# Patient Record
Sex: FEMALE | Race: WHITE | Employment: STUDENT | URBAN - METROPOLITAN AREA
[De-identification: names, ages, dates, MRNs, and addresses within clinical notes are randomized per-mention and may not be internally consistent; named-entity substitution may affect disease eponyms.]

---
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
## 2024-08-06 ENCOUNTER — OFFICE VISIT (OUTPATIENT)
Dept: ENDOCRINOLOGY | Age: 19
End: 2024-08-06

## 2024-08-06 VITALS
TEMPERATURE: 98.8 F | BODY MASS INDEX: 22.79 KG/M2 | HEIGHT: 61 IN | HEART RATE: 80 BPM | SYSTOLIC BLOOD PRESSURE: 104 MMHG | DIASTOLIC BLOOD PRESSURE: 69 MMHG | OXYGEN SATURATION: 98 % | WEIGHT: 120.7 LBS

## 2024-08-06 DIAGNOSIS — R82.998 ELEVATED URINARY FREE CORTISOL LEVEL: Primary | ICD-10-CM

## 2024-08-06 DIAGNOSIS — R53.83 OTHER FATIGUE: ICD-10-CM

## 2024-08-06 DIAGNOSIS — R41.89 BRAIN FOG: ICD-10-CM

## 2024-08-06 PROCEDURE — 99204 OFFICE O/P NEW MOD 45 MIN: CPT | Performed by: INTERNAL MEDICINE

## 2024-08-06 RX ORDER — AMOXICILLIN AND CLAVULANATE POTASSIUM 875; 125 MG/1; MG/1
1 TABLET, FILM COATED ORAL 2 TIMES DAILY
COMMUNITY
Start: 2024-07-30

## 2024-08-06 RX ORDER — LAMOTRIGINE 100 MG/1
100 TABLET ORAL 2 TIMES DAILY
COMMUNITY
Start: 2024-06-17

## 2024-11-01 ENCOUNTER — OFFICE VISIT (OUTPATIENT)
Dept: FAMILY MEDICINE CLINIC | Facility: CLINIC | Age: 19
End: 2024-11-01
Payer: COMMERCIAL

## 2024-11-01 VITALS
OXYGEN SATURATION: 98 % | HEIGHT: 61 IN | WEIGHT: 117 LBS | BODY MASS INDEX: 22.09 KG/M2 | HEART RATE: 99 BPM | RESPIRATION RATE: 18 BRPM | DIASTOLIC BLOOD PRESSURE: 59 MMHG | TEMPERATURE: 98 F | SYSTOLIC BLOOD PRESSURE: 93 MMHG

## 2024-11-01 DIAGNOSIS — J02.9 SORE THROAT: Primary | ICD-10-CM

## 2024-11-01 PROCEDURE — 99202 OFFICE O/P NEW SF 15 MIN: CPT | Performed by: FAMILY MEDICINE

## 2024-11-01 RX ORDER — AMOXICILLIN 875 MG/1
875 TABLET, COATED ORAL 2 TIMES DAILY
Qty: 20 TABLET | Refills: 0 | Status: SHIPPED | OUTPATIENT
Start: 2024-11-01 | End: 2024-11-11

## 2024-11-01 NOTE — PROGRESS NOTES
CHIEF COMPLAINT:     Chief Complaint   Patient presents with    Sore Throat     Pt tested positive for strep on Monday.  Pt didn't complete antibiotic  Sore THROAT, HEADACHE, Nausea, and fatigue . Since sat   OTC: none          HPI:   Jona Devlin is a 19 year old female presents to clinic with complaint of sore throat. Patient has had since 2-3 days ago.  Patient denies congestion, cough, fever, headache, nausea, rash. Pt was dx'd with strep 5 days ago while at college in [place].  Was given zpack and finished only 2 days of the med because she felt better. Pt reports sx started returning a few days after that. Treating symptoms with nothing  so far.    Current Outpatient Medications   Medication Sig Dispense Refill    amoxicillin 875 MG Oral Tab Take 1 tablet (875 mg total) by mouth 2 (two) times daily for 10 days. 20 tablet 0    EPIDUO FORTE 0.3-2.5 % External Gel        No current facility-administered medications for this visit.      No past medical history on file.   Social History:  Social History     Socioeconomic History    Marital status: Single   Tobacco Use    Smoking status: Never    Smokeless tobacco: Never   Vaping Use    Vaping status: Never Used   Substance and Sexual Activity    Alcohol use: Never     Alcohol/week: 0.0 standard drinks of alcohol    Drug use: Never    Sexual activity: Yes     Partners: Male     Birth control/protection: I.U.D.        REVIEW OF SYSTEMS:   GENERAL HEALTH: feels well otherwise, normal appetite  SKIN: denies any unusual skin lesions or rashes  HEENT: denies ear pain, See HPI  RESPIRATORY: denies shortness of breath or wheezing  CARDIOVASCULAR: denies chest pain or palpitations   GI: denies vomiting or diarrhea  NEURO: denies dizziness or lightheadedness    EXAM:   BP 93/59   Pulse 99   Temp 97.5 °F (36.4 °C) (Temporal)   Resp 18   Ht 5' 1\" (1.549 m)   Wt 117 lb (53.1 kg)   LMP  (LMP Unknown)   SpO2 98%   BMI 22.11 kg/m²   GENERAL: well developed, well  nourished,in no apparent distress  SKIN: no rashes,no suspicious lesions  HEAD: atraumatic, normocephalic  EYES: conjunctivae clear, EOM intact  EARS: TM's clear, non-injected, no bulging, retraction, or fluid bilaterally  NOSE: nostrils patent, no exudates, nasal mucosa pink and noninflamed  THROAT: oral mucosa pink, moist. Posterior pharynx mildly erythematous. No exudates. Tonsils 2/4.  Breath not malodorous   NECK: supple, non-tender  LUNGS: clear to auscultation bilaterally, no wheezes or rhonchi. Breathing is non labored.  CARDIO: RRR without murmur  EXTREMITIES: no cyanosis, clubbing or edema  LYMPH: + mild anterior cervical and submandibular lymphadenopathy.  No posterior cervical or occipital lymphadenopathy.    No results found for this or any previous visit (from the past 24 hours).      ASSESSMENT AND PLAN:     Encounter Diagnosis   Name Primary?    Sore throat Yes       Orders Placed This Encounter   Procedures    Strep A Assay W/Optic       Meds & Refills for this Visit:  Requested Prescriptions     Signed Prescriptions Disp Refills    amoxicillin 875 MG Oral Tab 20 tablet 0     Sig: Take 1 tablet (875 mg total) by mouth 2 (two) times daily for 10 days.       Imaging & Consults:  None.    Comfort measures explained and discussed as listed in Patient Instructions    Follow up in 3-5 days if not improving, condition worsens, or fever greater than or equal to 100.4 persists for 72 hours.      Patient Instructions   Take antibiotics with food and plenty of water.   Eat yogurt or take probiotic daily. (Probiotic-10 by [employer] is a good example of an OTC probiotic)  Make sure to finish the entire antibiotic treatment. You may wish to set an alarm on your phone to help remind you to take it twice a day.  Increase fluids and rest.   Use otc meds as needed.  Monitor symptoms and contact the office if no better in 2-3 days.      The patient/parent indicates understanding of these issues and agrees to the  plan.  The patient is asked to follow up with their PCP as needed.

## 2024-11-01 NOTE — PATIENT INSTRUCTIONS
Take antibiotics with food and plenty of water.   Eat yogurt or take probiotic daily. (Probiotic-10 by [employer] is a good example of an OTC probiotic)  Make sure to finish the entire antibiotic treatment. You may wish to set an alarm on your phone to help remind you to take it twice a day.  Increase fluids and rest.   Use otc meds as needed.  Monitor symptoms and contact the office if no better in 2-3 days.

## 2024-11-23 ENCOUNTER — OFFICE VISIT (OUTPATIENT)
Dept: FAMILY MEDICINE CLINIC | Facility: CLINIC | Age: 19
End: 2024-11-23
Payer: COMMERCIAL

## 2024-11-23 VITALS
BODY MASS INDEX: 22.66 KG/M2 | SYSTOLIC BLOOD PRESSURE: 84 MMHG | TEMPERATURE: 98 F | RESPIRATION RATE: 18 BRPM | HEART RATE: 78 BPM | DIASTOLIC BLOOD PRESSURE: 54 MMHG | WEIGHT: 120 LBS | OXYGEN SATURATION: 97 % | HEIGHT: 61 IN

## 2024-11-23 DIAGNOSIS — L28.2 PRURITIC RASH: Primary | ICD-10-CM

## 2024-11-23 PROCEDURE — 99213 OFFICE O/P EST LOW 20 MIN: CPT | Performed by: FAMILY MEDICINE

## 2024-11-23 RX ORDER — CLINDAMYCIN PHOSPHATE 10 UG/ML
1 LOTION TOPICAL 2 TIMES DAILY
COMMUNITY
Start: 2024-11-15

## 2024-11-23 RX ORDER — DOXYCYCLINE 100 MG/1
CAPSULE ORAL
COMMUNITY
Start: 2024-11-15

## 2024-11-23 RX ORDER — PREDNISONE 20 MG/1
TABLET ORAL
Qty: 10 TABLET | Refills: 0 | Status: SHIPPED | OUTPATIENT
Start: 2024-11-23

## 2024-11-23 RX ORDER — CHLORHEXIDINE GLUCONATE 213 G/1000ML
SOLUTION TOPICAL
COMMUNITY
Start: 2024-11-15

## 2024-11-23 NOTE — PROGRESS NOTES
CHIEF COMPLAINT:     Chief Complaint   Patient presents with    Rash     Rash all over the body.For a month   Pt was treated for folliculitis           HPI:    Jona Devlin is a 19 year old female who presents for evaluation of a rash that presented 1 month ago.  Rash has been persistent since onset.  Patient has not had similar rash in the past. The rash is characterized by intense itching and small faint pink papules.  Pt was seen by school clinic and given doxycycline, topical clindamycin and hibiclens for suspected folliculitis. Pt states that she has finished the doxy and has noted no improvement in her symptoms.    Exposure: prior to onset of rash pt had been on zpack for strep which she did not complete. Then pt was prescribed amoxicillin to treat strep completely, which she did finish. A few days after amox was complete she was seen at the clinic and given doxy. Pt states the rash has never improved.  Itching is most severe on her upper thighs, but pt has eruptions on upper and lower legs, abdomen, lower back, axilla and upper arms.     Pertinent negatives include no anorexia, congestion, cough, diarrhea, eye pain, facial edema, fatigue, fever, joint pain, rhinorrhea, shortness of breath, sore throat or vomiting.      Current Outpatient Medications   Medication Sig Dispense Refill    clindamycin 1 % External Lotion Apply 1 Application topically 2 (two) times daily. APPLY TO AFFECTED AREA      HIBICLENS 4 % External Solution every 28 days. FOR 21 DAYS IN, THEN REMOVE FOR 7 DAYS      Doxycycline Monohydrate 100 MG Oral Cap TAKE ONE CAPSULE EVERY 12 HRS WITH FOOD AND FULL GLASS OF WATER, MAY CAUSE INCREASED SUN-SENSIVITY      predniSONE 20 MG Oral Tab TAKE 2 TABS BY MOUTH DAILY FOR 5 DAYS 10 tablet 0    EPIDUO FORTE 0.3-2.5 % External Gel        No current facility-administered medications for this visit.      No past medical history on file.   No past surgical history on file.   Family History   Problem  Relation Age of Onset    High Blood Pressure Maternal Grandmother     Thyroid disease Maternal Grandmother         Cancer    Breast Cancer Paternal Great-Grandmother       Social History     Socioeconomic History    Marital status: Single   Tobacco Use    Smoking status: Never    Smokeless tobacco: Never   Vaping Use    Vaping status: Never Used   Substance and Sexual Activity    Alcohol use: Never     Alcohol/week: 0.0 standard drinks of alcohol    Drug use: Never    Sexual activity: Yes     Partners: Male     Birth control/protection: I.U.D.         REVIEW OF SYSTEMS:   GENERAL: feels well otherwise, no fever, no chills.  SKIN: Per HPI. No edema. No ulcerations.  HEENT: Denies rhinorrhea, edema of the lips or swelling of throat.  CARDIOVASCULAR: Denies chest pains or palpitations.  LUNGS: Denies shortness of breath with exertion or rest. No cough or wheezing.  LYMPH: Denies enlargement of the lymph nodes.  NEURO: Denies abnormal sensation, tingling of the skin, or numbness.      EXAM:   BP (!) 84/54 (BP Location: Right arm, Patient Position: Sitting, Cuff Size: adult)   Pulse 78   Temp 97.9 °F (36.6 °C) (Temporal)   Resp 18   Ht 5' 1\" (1.549 m)   Wt 120 lb (54.4 kg)   LMP  (LMP Unknown)   SpO2 97%   BMI 22.67 kg/m²   GENERAL: well developed, well nourished,in no apparent distress  SKIN: faint pink papular eruptions sparsely on lower legs, chest, abd. There is a more concentrated grouping of papules on the left upper thigh with bruising present from scratching. No pustules or vesicles.   EYES: PERRLA, EOMI, conjunctivae are clear  HENT: Head atraumatic, normocephalic. TM's WNL bilaterally. Normal external nose. Nasal mucosa pink without edema. No erythema of the throat. Oropharynx moist without lesions.  LUNGS: Clear to auscultation bilaterally.  No wheezing, rhonchi, or rales.  No diminished breath sounds. No increased work of breathing.   CARDIO: RRR without murmur  LYMPH: No lymphadenopathy.      ASSESSMENT AND PLAN:   Jona Devlin is a 19 year old female who presents for evaluation of a rash. Findings are consistent with:    ASSESSMENT:  Encounter Diagnosis   Name Primary?    Pruritic rash Yes       PLAN: Meds as listed below.  Comfort measures as described in Patient Instructions.  Skin care discussed with patient.     Meds & Refills for this Visit:  Requested Prescriptions     Signed Prescriptions Disp Refills    predniSONE 20 MG Oral Tab 10 tablet 0     Sig: TAKE 2 TABS BY MOUTH DAILY FOR 5 DAYS         Risk and benefits of medication discussed.     Patient Instructions   Stop topical creams for now.   Take claritin/zyrtec/allegra in the morning.   Take benadryl at bedtime.   Take prednisone-- 2 tabs once daily for 5 days. Take with food.   While you are on steroids it is preferred that you take Tylenol for pain if needed rather than ibuprofen (Motrin/Ibuprofen) or Aleve.    Use an emollient lotion like eucerin or other moisturizing lotion without fragrance and dye.   Consider removing fragrance and dye from laundry and skin care for now.   Monitor symptoms.   If no better at all with steroid use or if symptoms improve and then worsen, consider follow-up with PCP or dermatology for further evaluation.     The patient indicates understanding of these issues and agrees to the plan.

## 2024-11-23 NOTE — PATIENT INSTRUCTIONS
Stop topical creams for now.   Take claritin/zyrtec/allegra in the morning.   Take benadryl at bedtime.   Take prednisone-- 2 tabs once daily for 5 days. Take with food.   While you are on steroids it is preferred that you take Tylenol for pain if needed rather than ibuprofen (Motrin/Ibuprofen) or Aleve.    Use an emollient lotion like eucerin or other moisturizing lotion without fragrance and dye.   Consider removing fragrance and dye from laundry and skin care for now.   Monitor symptoms.   If no better at all with steroid use or if symptoms improve and then worsen, consider follow-up with PCP or dermatology for further evaluation.

## (undated) NOTE — ED AVS SNAPSHOT
Parent/Legal Guardian Access to the Online [employer] Record of a Patient 15to 16Years Old  Return completed form by Secure email to [facility]/Medical Records Department: [email or web address].     Requirements and Procedures   Under [facility] MyChart ID and password with another person, that person may be able to view my or my child’s health information, and health information about someone who has authorized me as a MyChart proxy.    ·  I agree that it is my responsibility to select a confident Sign-Up Form and I agree to its terms.        Authorization Form     Please enter Patient’s information below:   Name (last, first, middle initial) __________________________________________   Gender  Male  Female    Last 4 Digits of Social Security Number Parent/Legal Guardian Signature                                  For Patient (1517 years of age)  I agree to allow my parent/legal guardian, named above, online access to my medical information currently available and that may become available as a result

## (undated) NOTE — ED AVS SNAPSHOT
Martinez James   MRN: [id number]    Department:  [facility] Emergency Department   Date of Visit:  11/19/2019           Disclosure     Insurance plans vary and the physician(s) referred by the ER may not be covered by your plan.  Please contact yo tell this physician (or your personal doctor if your instructions are to return to your personal doctor) about any new or lasting problems. The primary care or specialist physician will see patients referred from the [facility] Emergency Department.  Wing Culver

## (undated) NOTE — ED AVS SNAPSHOT
Gwendolyn Sullivanaaliyahesteban   MRN: [id number]    Department:  [facility] Emergency Department   Date of Visit:  4/17/2018           Disclosure     Insurance plans vary and the physician(s) referred by the ER may not be covered by your plan.  Please contact you tell this physician (or your personal doctor if your instructions are to return to your personal doctor) about any new or lasting problems. The primary care or specialist physician will see patients referred from the [facility] Emergency Department.  Severo Pastures

## (undated) NOTE — ED AVS SNAPSHOT
Sarkis Fox   MRN: [id number]    Department:  [facility] Emergency Department   Date of Visit:  10/30/2017           Disclosure     Insurance plans vary and the physician(s) referred by the ER may not be covered by your plan.  Please contact yo If you have been prescribed any medication(s), please fill your prescription right away and begin taking the medication(s) as directed    If the emergency physician has read X-rays, these will be re-interpreted by a radiologist.  If there is a significant

## (undated) NOTE — LETTER
Date & Time: 11/19/2019, 2:34 PM  Patient: Kenneth Herring  Encounter Provider(s):    Val Carcamo MD       To Whom It May Concern:    Jamila Rivas was seen and treated in our department on 11/19/2019.  She should not participate in gym/sports unti